# Patient Record
Sex: FEMALE | Race: OTHER | HISPANIC OR LATINO
[De-identification: names, ages, dates, MRNs, and addresses within clinical notes are randomized per-mention and may not be internally consistent; named-entity substitution may affect disease eponyms.]

---

## 2017-01-13 ENCOUNTER — APPOINTMENT (OUTPATIENT)
Dept: PULMONOLOGY | Facility: CLINIC | Age: 32
End: 2017-01-13

## 2017-01-13 VITALS
TEMPERATURE: 97.9 F | HEART RATE: 73 BPM | SYSTOLIC BLOOD PRESSURE: 111 MMHG | OXYGEN SATURATION: 98 % | BODY MASS INDEX: 20.62 KG/M2 | RESPIRATION RATE: 16 BRPM | HEIGHT: 60 IN | WEIGHT: 105 LBS | DIASTOLIC BLOOD PRESSURE: 72 MMHG

## 2017-01-13 RX ORDER — AZITHROMYCIN 500 MG/1
500 TABLET, FILM COATED ORAL
Qty: 6 | Refills: 1 | Status: ACTIVE | COMMUNITY
Start: 2017-01-13 | End: 1900-01-01

## 2017-01-15 ENCOUNTER — TRANSCRIPTION ENCOUNTER (OUTPATIENT)
Age: 32
End: 2017-01-15

## 2017-01-18 ENCOUNTER — APPOINTMENT (OUTPATIENT)
Dept: PULMONOLOGY | Facility: CLINIC | Age: 32
End: 2017-01-18

## 2017-01-18 VITALS
RESPIRATION RATE: 16 BRPM | SYSTOLIC BLOOD PRESSURE: 113 MMHG | TEMPERATURE: 98.2 F | OXYGEN SATURATION: 98 % | BODY MASS INDEX: 20.62 KG/M2 | DIASTOLIC BLOOD PRESSURE: 71 MMHG | HEIGHT: 60 IN | WEIGHT: 105 LBS | HEART RATE: 66 BPM

## 2017-01-18 DIAGNOSIS — J45.909 UNSPECIFIED ASTHMA, UNCOMPLICATED: ICD-10-CM

## 2017-01-18 DIAGNOSIS — J06.9 ACUTE UPPER RESPIRATORY INFECTION, UNSPECIFIED: ICD-10-CM

## 2017-01-18 DIAGNOSIS — R06.09 OTHER FORMS OF DYSPNEA: ICD-10-CM

## 2017-01-18 DIAGNOSIS — R05 COUGH: ICD-10-CM

## 2017-01-18 RX ORDER — METHYLPREDNISOLONE 4 MG/1
4 TABLET ORAL
Qty: 28 | Refills: 1 | Status: ACTIVE | COMMUNITY
Start: 2017-01-18 | End: 1900-01-01

## 2017-01-18 RX ORDER — OSELTAMIVIR PHOSPHATE 75 MG/1
75 CAPSULE ORAL TWICE DAILY
Qty: 10 | Refills: 0 | Status: ACTIVE | COMMUNITY
Start: 2017-01-18 | End: 1900-01-01

## 2017-03-30 ENCOUNTER — EMERGENCY (EMERGENCY)
Facility: HOSPITAL | Age: 32
LOS: 1 days | Discharge: HOME | End: 2017-03-30
Attending: EMERGENCY MEDICINE
Payer: COMMERCIAL

## 2017-03-30 VITALS
TEMPERATURE: 98 F | HEART RATE: 84 BPM | DIASTOLIC BLOOD PRESSURE: 91 MMHG | SYSTOLIC BLOOD PRESSURE: 141 MMHG | WEIGHT: 102.96 LBS | HEIGHT: 60 IN | OXYGEN SATURATION: 100 % | RESPIRATION RATE: 18 BRPM

## 2017-03-30 DIAGNOSIS — N30.01 ACUTE CYSTITIS WITH HEMATURIA: ICD-10-CM

## 2017-03-30 LAB
APPEARANCE UR: ABNORMAL
BILIRUB UR-MCNC: NEGATIVE — SIGNIFICANT CHANGE UP
COLOR SPEC: ABNORMAL
DIFF PNL FLD: ABNORMAL
GLUCOSE UR QL: NEGATIVE — SIGNIFICANT CHANGE UP
HCG UR QL: NEGATIVE — SIGNIFICANT CHANGE UP
KETONES UR-MCNC: NEGATIVE — SIGNIFICANT CHANGE UP
LEUKOCYTE ESTERASE UR-ACNC: ABNORMAL
NITRITE UR-MCNC: NEGATIVE — SIGNIFICANT CHANGE UP
PH UR: 6 — SIGNIFICANT CHANGE UP (ref 4.8–8)
PROT UR-MCNC: 100
SP GR SPEC: 1.02 — SIGNIFICANT CHANGE UP (ref 1.01–1.02)
UROBILINOGEN FLD QL: 1

## 2017-03-30 PROCEDURE — 99283 EMERGENCY DEPT VISIT LOW MDM: CPT | Mod: 25

## 2017-03-30 PROCEDURE — 81001 URINALYSIS AUTO W/SCOPE: CPT

## 2017-03-30 PROCEDURE — 99283 EMERGENCY DEPT VISIT LOW MDM: CPT

## 2017-03-30 PROCEDURE — 81025 URINE PREGNANCY TEST: CPT

## 2017-03-30 RX ORDER — CEPHALEXIN 500 MG
500 CAPSULE ORAL ONCE
Qty: 0 | Refills: 0 | Status: COMPLETED | OUTPATIENT
Start: 2017-03-30 | End: 2017-03-30

## 2017-03-30 RX ORDER — CEPHALEXIN 500 MG
1 CAPSULE ORAL
Qty: 13 | Refills: 0 | OUTPATIENT
Start: 2017-03-30 | End: 2017-04-06

## 2017-03-30 RX ADMIN — Medication 500 MILLIGRAM(S): at 02:28

## 2017-03-30 NOTE — ED PROVIDER NOTE - MEDICAL DECISION MAKING DETAILS
33yo female with dysuria and hematuria, will check for pregnancy, will check for UTI and tx if positive, no signs of ureteral stone, or other concerning symptoms. instructed to f/u with her pmd in 1 week for persistent hematuria.

## 2017-03-30 NOTE — ED ADULT NURSE NOTE - OBJECTIVE STATEMENT
Pt states that today she started having bloody urination. feels like urinating but not able to do so. Denies fever and chills, back pain, nausea and chills

## 2017-03-30 NOTE — ED PROVIDER NOTE - PHYSICAL EXAMINATION
Gen: Nad, resting comfortably  heart: rrr, ns1s2, no murmur  lungs: ctab  Abd: soft, nt, nd  Neuro: aox3, CN II-XII grossly intake  Pt ambulating without difficulty, normal gait   no cva tenderness

## 2017-03-30 NOTE — ED PROVIDER NOTE - OBJECTIVE STATEMENT
33yo female no pmh, p/w burning with urination and hematuria starting today. no back pain, no fevers chills, no vaginal bleeding, she notes a uti before, but not as bad, takes an OCP. Nothing else is bother her, she didn't take anything for the pain.

## 2017-06-17 ENCOUNTER — TRANSCRIPTION ENCOUNTER (OUTPATIENT)
Age: 32
End: 2017-06-17

## 2018-11-21 ENCOUNTER — EMERGENCY (EMERGENCY)
Facility: HOSPITAL | Age: 33
LOS: 1 days | Discharge: HOME | End: 2018-11-21
Attending: EMERGENCY MEDICINE
Payer: COMMERCIAL

## 2018-11-21 VITALS
OXYGEN SATURATION: 98 % | SYSTOLIC BLOOD PRESSURE: 140 MMHG | RESPIRATION RATE: 16 BRPM | TEMPERATURE: 98 F | DIASTOLIC BLOOD PRESSURE: 88 MMHG | HEART RATE: 84 BPM

## 2018-11-21 VITALS — WEIGHT: 119.93 LBS

## 2018-11-21 DIAGNOSIS — N75.0 CYST OF BARTHOLIN'S GLAND: ICD-10-CM

## 2018-11-21 LAB
APPEARANCE UR: CLEAR — SIGNIFICANT CHANGE UP
BILIRUB UR-MCNC: NEGATIVE — SIGNIFICANT CHANGE UP
COLOR SPEC: YELLOW — SIGNIFICANT CHANGE UP
DIFF PNL FLD: ABNORMAL
GLUCOSE UR QL: NEGATIVE — SIGNIFICANT CHANGE UP
HCG UR QL: NEGATIVE — SIGNIFICANT CHANGE UP
KETONES UR-MCNC: NEGATIVE — SIGNIFICANT CHANGE UP
LEUKOCYTE ESTERASE UR-ACNC: ABNORMAL
NITRITE UR-MCNC: NEGATIVE — SIGNIFICANT CHANGE UP
PH UR: 6.5 — SIGNIFICANT CHANGE UP (ref 5–8)
PROT UR-MCNC: NEGATIVE — SIGNIFICANT CHANGE UP
SP GR SPEC: 1 — LOW (ref 1.01–1.02)
UROBILINOGEN FLD QL: NEGATIVE — SIGNIFICANT CHANGE UP

## 2018-11-21 PROCEDURE — 81025 URINE PREGNANCY TEST: CPT

## 2018-11-21 PROCEDURE — 99284 EMERGENCY DEPT VISIT MOD MDM: CPT

## 2018-11-21 PROCEDURE — 81001 URINALYSIS AUTO W/SCOPE: CPT

## 2018-11-21 RX ORDER — MELOXICAM 15 MG/1
1 TABLET ORAL
Qty: 5 | Refills: 0 | OUTPATIENT
Start: 2018-11-21 | End: 2018-11-25

## 2018-11-21 RX ORDER — OXYCODONE AND ACETAMINOPHEN 5; 325 MG/1; MG/1
1 TABLET ORAL ONCE
Qty: 0 | Refills: 0 | Status: DISCONTINUED | OUTPATIENT
Start: 2018-11-21 | End: 2018-11-21

## 2018-11-21 RX ADMIN — Medication 1 TABLET(S): at 18:10

## 2018-11-21 RX ADMIN — OXYCODONE AND ACETAMINOPHEN 1 TABLET(S): 5; 325 TABLET ORAL at 17:42

## 2018-11-21 NOTE — ED ADULT NURSE NOTE - NSIMPLEMENTINTERV_GEN_ALL_ED
Implemented All Universal Safety Interventions:  [place] to call system. Call bell, personal items and telephone within reach. Instruct patient to call for assistance. Room bathroom lighting operational. Non-slip footwear when patient is off stretcher. Physically safe environment: no spills, clutter or unnecessary equipment. Stretcher in lowest position, wheels locked, appropriate side rails in place.

## 2018-11-21 NOTE — ED STATDOCS - OBJECTIVE STATEMENT
Telemedicine assessment was conducted (using real time 2 way audio-video technology) by Dr. Gilberto Salmeron located at [address] [place] [id number]  +++++++++++++++++++++++++++++++++++++++++++++++++++  History and Plan:   32 y/o F pt w/ PMHx of presents to ED c/o worsening vaginal swelling and pain x 3 days. Pt reports pain with sitting. Pt took Advil with improvement of symptoms. Pt states she had intercourse the day prior to vaginal swelling and pain. Pt has had no similar episode in the past. Denies vaginal discharge and vaginal bleeding. Pt has no history of STDs or recent history of unprotected intercourse. Pt took Motrin today without improvement of symptoms. NKDA. LNMP: 11/5/2018.  Plan: Will obtain UA. Provider on site to evaluate with examination. Patient seen by me in intake for initial assessment and ordering. Physician on site to follow results and further evaluate and treat patient.

## 2018-11-21 NOTE — ED PROVIDER NOTE - GENITOURINARY EXTERNAL GENERAL. FEMALE
Left labia swelling and erythema, tenderness over bartholins gland, redness of left vulva and labia majora

## 2018-11-21 NOTE — ED PROVIDER NOTE - OBJECTIVE STATEMENT
32 y/o F patient with no significant PMHx of asthma and no significant PSHx presents to the ED with worsening left vaginal pain and swelling for x2 days. Patient denies a history of presenting Sx. Patient states she took Advil today with no relief. Patient denies fever and any other complaints. Patient denies new sexual partners. NKDA.

## 2018-11-21 NOTE — CONSULT NOTE ADULT - SUBJECTIVE AND OBJECTIVE BOX
32 y/o F patient with no significant PMHx of asthma and no significant PSHx presents to the ED with worsening left vaginal pain and swelling for x2 days. Patient denies a history of presenting Sx. Patient states she took Advil today with no relief. Patient denies fever and any other complaints. Patient denies new sexual partners. NKDA.	  · Presenting Symptoms: vaginal pain, vaginal swelling	  gyn   pob/gynhx: ; std's; none no abn pap smear; no fibroids;   pmhx: none  pshx: none  all: nka  meds: cephalexin 500 mg oral capsule: 1 cap(s) orally 2 times a day  · 	Z-Yanick: 1 unit(s)  once a day  · 	predniSONE 20 mg oral tablet: 2 tab(s) orally once a day  · 	Acidophilus: 2 tab(s) orally 3 times a day    sochx: no etoh/drug/tobacco use    REVIEW OF SYSTEMS: see HPI	    PE:  Vital Signs Last 24 Hrs  T(C): 36.8 (2018 15:27), Max: 36.8 (2018 15:27)  T(F): 98.2 (2018 15:27), Max: 98.2 (2018 15:27)  HR: 84 (2018 15:27) (84 - 84)  BP: 140/88 (2018 15:27) (140/88 - 140/88)  BP(mean): --  RR: 16 (2018 15:27) (16 - 16)  SpO2: 98% (2018 15:27) (98% - 98%)  abd: +bs; soft, nt, nd, no rebound or guarding; no cvat b/l  pelvis:   External Genitalia, Female: Left labia swelling and erythema, tenderness over bartholins gland, redness of left vulva and labia majora	              Urinalysis Basic - ( 2018 16:13 )    Color: Yellow / Appearance: Clear / S.005 / pH: x  Gluc: x / Ketone: Negative  / Bili: Negative / Urobili: Negative   Blood: x / Protein: Negative / Nitrite: Negative   Leuk Esterase: Moderate / RBC: 5-10 /HPF / WBC 11-25 /HPF   Sq Epi: x / Non Sq Epi: Many /HPF / Bacteria: Few /HPF

## 2018-11-21 NOTE — ED ADULT NURSE NOTE - OBJECTIVE STATEMENT
pt from home c/o of vaginal swelling with pain x3 days pt is alert awake oriented x3 denies any trauma or injury denies any fever at home denies any vaginal bleeding LMP 11/5

## 2018-11-21 NOTE — ED PROVIDER NOTE - PROGRESS NOTE DETAILS
Patient seen by OB/Gyn, no ID at this time, will give ABX, and sitz baths. Follow up with private GYN. Pt is well appearing walking with steady gait, stable for discharge and follow up without fail with medical doctor. I had a detailed discussion with the patient and/or guardian regarding the historical points, exam findings, and any diagnostic results supporting the discharge diagnosis. Pt educated on care and need for follow up. Strict return instructions and red flag signs and symptoms discussed with patient. Questions answered. Pt shows understanding of discharge information and agrees to follow.

## 2018-11-21 NOTE — CONSULT NOTE ADULT - PROBLEM SELECTOR RECOMMENDATION 9
Left Pratima gill  pt seen with   will start augmentin 875bid x 10 days  naprosyn  sitz baths  follow up with own gyn  d/w dr. gene lance

## 2019-03-13 ENCOUNTER — TRANSCRIPTION ENCOUNTER (OUTPATIENT)
Age: 34
End: 2019-03-13

## 2019-05-05 ENCOUNTER — TRANSCRIPTION ENCOUNTER (OUTPATIENT)
Age: 34
End: 2019-05-05

## 2020-06-12 ENCOUNTER — OUTPATIENT (OUTPATIENT)
Dept: OUTPATIENT SERVICES | Facility: HOSPITAL | Age: 35
LOS: 1 days | End: 2020-06-12
Payer: COMMERCIAL

## 2020-06-12 ENCOUNTER — RESULT REVIEW (OUTPATIENT)
Age: 35
End: 2020-06-12

## 2020-06-12 ENCOUNTER — APPOINTMENT (OUTPATIENT)
Dept: RADIOLOGY | Facility: IMAGING CENTER | Age: 35
End: 2020-06-12
Payer: COMMERCIAL

## 2020-06-12 DIAGNOSIS — Z00.8 ENCOUNTER FOR OTHER GENERAL EXAMINATION: ICD-10-CM

## 2020-06-12 PROCEDURE — 71046 X-RAY EXAM CHEST 2 VIEWS: CPT

## 2020-06-12 PROCEDURE — 71046 X-RAY EXAM CHEST 2 VIEWS: CPT | Mod: 26

## 2020-12-15 PROBLEM — J06.9 ACUTE URI: Status: RESOLVED | Noted: 2017-01-13 | Resolved: 2020-12-15

## 2021-08-25 ENCOUNTER — TRANSCRIPTION ENCOUNTER (OUTPATIENT)
Age: 36
End: 2021-08-25

## 2022-05-25 ENCOUNTER — NON-APPOINTMENT (OUTPATIENT)
Age: 37
End: 2022-05-25

## 2022-06-08 ENCOUNTER — EMERGENCY (EMERGENCY)
Facility: HOSPITAL | Age: 37
LOS: 0 days | Discharge: HOME | End: 2022-06-08
Attending: EMERGENCY MEDICINE
Payer: COMMERCIAL

## 2022-06-08 VITALS
SYSTOLIC BLOOD PRESSURE: 133 MMHG | TEMPERATURE: 99 F | WEIGHT: 125 LBS | HEIGHT: 60 IN | OXYGEN SATURATION: 98 % | HEART RATE: 88 BPM | RESPIRATION RATE: 18 BRPM | DIASTOLIC BLOOD PRESSURE: 81 MMHG
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
## 2022-06-08 DIAGNOSIS — J45.909 UNSPECIFIED ASTHMA, UNCOMPLICATED: ICD-10-CM
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
## 2022-06-08 DIAGNOSIS — M25.561 PAIN IN RIGHT KNEE: ICD-10-CM
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
## 2022-06-08 DIAGNOSIS — S83.91XA SPRAIN OF UNSPECIFIED SITE OF RIGHT KNEE, INITIAL ENCOUNTER: ICD-10-CM

## 2022-06-08 PROCEDURE — 73562 X-RAY EXAM OF KNEE 3: CPT | Mod: 26,RT

## 2022-06-08 PROCEDURE — 99283 EMERGENCY DEPT VISIT LOW MDM: CPT | Mod: 25

## 2022-06-08 PROCEDURE — 73562 X-RAY EXAM OF KNEE 3: CPT | Mod: RT

## 2022-06-08 PROCEDURE — 99283 EMERGENCY DEPT VISIT LOW MDM: CPT

## 2022-06-08 RX ORDER — IBUPROFEN 200 MG
600 TABLET ORAL ONCE
Refills: 0 | Status: COMPLETED | OUTPATIENT
Start: 2022-06-08 | End: 2022-06-08

## 2022-06-08 RX ADMIN — Medication 600 MILLIGRAM(S): at 22:01

## 2022-06-08 NOTE — ED PROVIDER NOTE - NS ED ROS FT
Constitutional: nad, well appearing  HEENT:  no nasal congestion, eye drainage or ear pain.    CVS:  no cp  Resp:  No sob, no cough  GI:  no abdominal pain, no nausea or vomiting  :  no dysuria  MSK: +right knee pain  Skin: no rash  Neuro: no change in mental status or level of consciousness  Heme/lymph: no bleeding

## 2022-06-08 NOTE — ED PROVIDER NOTE - CLINICAL SUMMARY MEDICAL DECISION MAKING FREE TEXT BOX
Likely internal derangement of knee. Do not suspect dislocation. Neurovascularly intact distally. Will x-ray, likely place in knee mobilizer, follow-up with ortho.

## 2022-06-08 NOTE — ED PROVIDER NOTE - PHYSICAL EXAMINATION
Constitutional: NAD, well appearing  HEENT: no rhinorrhea, PERRL, no oropharyngeal erythema or exudates, midline uvula.  TMs clear.  CVS:  RRR, no m/r/g  Resp:  CTAB  GI: soft, ntnd  MSK: TTP medial aspect of right knee. negative anterior posterior drawers.   Neuro:  A&Ox3, 5/5 strength to all extremities,  SILT to all extremities  Skin: no rash  psych: clear thought content  Heme/lymph:  No LAD Constitutional: NAD, well appearing  HEENT: no rhinorrhea, PERRL, no oropharyngeal erythema or exudates, midline uvula.  TMs clear.  CVS:  RRR, no m/r/g  Resp:  CTAB  GI: soft, ntnd  MSK: +TTP medial aspect of right knee. negative anterior posterior drawers.   Neuro:  A&Ox3, 5/5 strength to all extremities,  SILT to all extremities  Skin: no rash  psych: clear thought content  Heme/lymph:  No LAD

## 2022-06-08 NOTE — ED PROVIDER NOTE - CARE PROVIDER_API CALL
Ian Valencia (DO)  Orthopaedic Surgery  [address] [place] [address]  Phone: [phone]  Fax: [phone]  Follow Up Time: 1-3 Days

## 2022-06-08 NOTE — ED PROVIDER NOTE - OBJECTIVE STATEMENT
38 y/o female with PMHx of asthma presents to the ED c/o right knee pain after pivoting while playing with her dog 2 hours PTA. Pt has hx of acl tear to right knee. Pt took 500mg tylenol eipta with no relief.    liekly internal derrangtemen t of knee do not suspect edilocaton, neurovascularly intact distally  xray liekyl place in knee mobilizer follow-up with ortho. 38 y/o female with PMHx of asthma presents to the ED c/o right knee pain after pivoting while playing with her dog 2 hours PTA. Pt has hx of ACL tear to right knee. Pt took 500mg Tylenol PTA with no relief. Pt unable to move leg 2/2 pain.

## 2022-06-08 NOTE — ED PROVIDER NOTE - NSICDXFAMILYHX_GEN_ALL_CORE_FT
ED Medical Screen (RME)





- General


Chief Complaint: Cough


Stated Complaint: DIZZY


Time Seen by Provider: 08/10/18 17:09


Mode of Arrival: Ambulatory


Information source: Patient


TRAVEL OUTSIDE OF THE [place] IN LAST 30 DAYS: No





- HPI


Patient complains to provider of: cough


Onset: Other - pt. with c/o productive cough and occ. dizziness for the past 

several days.





- Related Data


Allergies/Adverse Reactions: 


 





No Known Allergies Allergy (Verified 08/10/18 16:08)


 











Past Medical History





- Social History


Chew tobacco use (# tins/day): No


Frequency of alcohol use: Occasional


Drug Abuse: None





- Past Medical History


Cardiac Medical History: Reports: Hx Hypercholesterolemia, Hx Hypertension


Pulmonary Medical History: Reports: Hx COPD, Hx Pneumonia


Renal/ Medical History: Denies: Hx Peritoneal Dialysis


Psychiatric Medical History: Reports: Hx Depression, Hx Schizophrenia


Past Surgical History: Reports: Hx Cholecystectomy, Hx Herniorrhaphy





Physical Exam





- Vital signs


Vitals: 





 











Temp Pulse Resp BP Pulse Ox


 


 99.2 F   78   20   140/87 H  96 


 


 08/10/18 16:20  08/10/18 16:20  08/10/18 16:20  08/10/18 16:20  08/10/18 16:20














Course





- Vital Signs


Vital signs: 





 











Temp Pulse Resp BP Pulse Ox


 


 99.2 F   78   20   140/87 H  96 


 


 08/10/18 16:20  08/10/18 16:20  08/10/18 16:20  08/10/18 16:20  08/10/18 16:20 FAMILY HISTORY:  No pertinent family history in first degree relatives

## 2022-06-08 NOTE — ED PROVIDER NOTE - SPECIALTY CARE
The Mammogram Department at [facility] called about the order for this patients upcoming Mammo. They said they are trying to figure out when her last Mammogram was and they have tried to call the patient to figure that out. She has a feeling that the patient might need a Mammo Bilateral and she was wondering if Edgardo could go ahead and put that order in and also keep the original in there. That way she can choose the correct order when the patient comes in.   Orthopedic Surgery

## 2022-06-08 NOTE — ED PROVIDER NOTE - PROGRESS NOTE DETAILS
Xray unremarkable.  NVI.  Well appearing.  WIll place in knee immobilizer.  To f/u ortho.  D/c home with strict return precautions and prompt outpatient f/u.

## 2022-06-08 NOTE — ED ADULT TRIAGE NOTE - CHIEF COMPLAINT QUOTE
c/o severe right knee pain after pivoting while playing with dog in backyard. pt states she didn't hear a popping sensation. hx acl tear in the past. right leg splinted and braced PTA by EMS

## 2022-06-08 NOTE — ED PROVIDER NOTE - PATIENT PORTAL LINK FT
You can access the FollowMyHealth Patient Portal offered by [facility] by registering at the following website: [email or web address]. By joining depict’s FollowMyHealth portal, you will also be able to view your health information using other applications (apps) compatible with our system.

## 2022-06-08 NOTE — ED ADULT NURSE NOTE - NSIMPLEMENTINTERV_GEN_ALL_ED
Implemented All Fall Risk Interventions:  [place] to call system. Call bell, personal items and telephone within reach. Instruct patient to call for assistance. Room bathroom lighting operational. Non-slip footwear when patient is off stretcher. Physically safe environment: no spills, clutter or unnecessary equipment. Stretcher in lowest position, wheels locked, appropriate side rails in place. Provide visual cue, wrist band, yellow gown, etc. Monitor gait and stability. Monitor for mental status changes and reorient to person, place, and time. Review medications for side effects contributing to fall risk. Reinforce activity limits and safety measures with patient and family.

## 2022-06-09 ENCOUNTER — TRANSCRIPTION ENCOUNTER (OUTPATIENT)
Age: 37
End: 2022-06-09

## 2022-06-09 ENCOUNTER — NON-APPOINTMENT (OUTPATIENT)
Age: 37
End: 2022-06-09

## 2022-06-13 ENCOUNTER — APPOINTMENT (OUTPATIENT)
Dept: ORTHOPEDIC SURGERY | Facility: CLINIC | Age: 37
End: 2022-06-13

## 2022-06-13 ENCOUNTER — NON-APPOINTMENT (OUTPATIENT)
Age: 37
End: 2022-06-13

## 2022-06-13 DIAGNOSIS — M25.561 PAIN IN RIGHT KNEE: ICD-10-CM

## 2022-06-13 PROCEDURE — 99203 OFFICE O/P NEW LOW 30 MIN: CPT

## 2022-06-14 NOTE — PHYSICAL EXAM
[de-identified] : Right Knee:\par Knee: Range of Motion in Degrees	\par 	                  Claimant:	Normal:	\par Flexion Active	  135 	                135-degrees	\par Flexion Passive	  135	                135-degrees	\par Extension Active	  0-5	                0-5-degrees	\par Extension Passive	  0-5	                0-5-degrees	\par \par No weakness to flexion/extension.  Positive instability in the AP plane.  No evidence of instability in varus or valgus stress.  Mild tenderness and a 1+ gap to valgus stress.  Positive Lachman.  Positive pivot shift.  Negative anterior drawer test.  Negative posterior drawer test.  Positive medial joint line tenderness.  Negative lateral joint line tenderness.  Positive Anny.  Positive Apley grind.  No tenderness over the medial and lateral facet of the patella.  No patellofemoral crepitations.  No lateral tilting patella.  No patella apprehension.  No crepitation in the medial and lateral femoral condyle.  No proximal or distal swelling, edema or tenderness.  No gross motor or sensory deficits.  Mild intra-articular swelling.  2+ DP and PT pulses.  No varus or valgus malalignment.  Skin is intact.  No rashes, scars or lesions.   \par \par Left Knee:\par Knee: Range of Motion in Degrees	\par 	                  Claimant:	Normal:	\par Flexion Active	  135 	                135-degrees	\par Flexion Passive	  135	                135-degrees	\par Extension Active	  0-5	                0-5-degrees	\par Extension Passive	  0-5	                0-5-degrees	\par \par No weakness to flexion/extension.  No evidence of instability in the AP plane or varus or valgus stress.  Negative  Lachman.  Negative pivot shift.  Negative anterior drawer test.  Negative posterior drawer test.  Negative Anny.  Negative Apley grind.  No medial or lateral joint line tenderness.  No tenderness over the medial and lateral facet of the patella.  No patellofemoral crepitations.  No lateral tilting patella.  No patellar apprehension.  No crepitation in the medial and lateral femoral condyle.  No proximal tenderness.  No gross motor or sensory deficits.  Mild effusion.  2+ DP and PT pulses. No varus or valgus malalignment.  Skin is intact.  No rashes, scars or lesions.  \par   [de-identified] : Gait and Station:  Ambulating with a slightly antalgic to antalgic gait.  Station:  Normal.  [de-identified] : Appearance:  Well-developed, well-nourished female in no acute distress.\par   [de-identified] : Review of outside radiographs show no obvious osseous abnormalities.

## 2022-06-14 NOTE — DISCUSSION/SUMMARY
[de-identified] : At this time, due to ACL, MCL and medial meniscal tear of the right knee, I recommend collateral hinged bracing, ice and elevation.  I recommend an MRI.  She will be reassessed after the MRI.\par \par The patient was prescribed a rigid support Playmaker knee brace with range of motion joints.  The brace will safely protect the patient and help to facilitate healing by stabilizing and controlling the knee.  In order to prevent skin breakdown along bony prominences and to avoid compression of the peroneal nerve, a custom fit is necessary.\par

## 2022-06-14 NOTE — CONSULT LETTER
[Dear  ___] : Dear  [unfilled], [Consult Letter:] : I had the pleasure of evaluating your patient, [unfilled]. [Please see my note below.] : Please see my note below. [Consult Closing:] : Thank you very much for allowing me to participate in the care of this patient.  If you have any questions, please do not hesitate to contact me. [Sincerely,] : Sincerely, [FreeTextEntry3] : Fam Cason, III, MD\par

## 2022-06-14 NOTE — ADDENDUM
[FreeTextEntry1] : This note was written by Latrice Vick on 06/14/2022 acting as scribe for Fam Cason III, MD

## 2022-06-14 NOTE — HISTORY OF PRESENT ILLNESS
[de-identified] : The patient comes in today with a long history of partial and/or complete ACL tear.  She was running in the backyard, slipped and twisted her knee and had sudden onset of pain and swelling.  She had x-rays at [facility] and comes here for evaluation.  The patient states the onset/injury occurred 06/08/2022.  This injury is not work related or due to an automobile accident.  The patient states the pain is localized.  The patient describes the pain as dull. [3] : a current pain level of 3/10 [de-identified] : walking, bending [de-identified] : rest, ice

## 2022-06-15 DIAGNOSIS — Z87.09 PERSONAL HISTORY OF OTHER DISEASES OF THE RESPIRATORY SYSTEM: ICD-10-CM

## 2022-06-15 DIAGNOSIS — Z82.49 FAMILY HISTORY OF ISCHEMIC HEART DISEASE AND OTHER DISEASES OF THE CIRCULATORY SYSTEM: ICD-10-CM

## 2022-06-15 DIAGNOSIS — Z72.89 OTHER PROBLEMS RELATED TO LIFESTYLE: ICD-10-CM

## 2022-06-15 DIAGNOSIS — Z83.3 FAMILY HISTORY OF DIABETES MELLITUS: ICD-10-CM

## 2022-06-15 RX ORDER — CLARITHROMYCIN 250 MG/5ML
FOR SUSPENSION ORAL
Refills: 0 | Status: ACTIVE | COMMUNITY

## 2022-06-29 ENCOUNTER — APPOINTMENT (OUTPATIENT)
Dept: ORTHOPEDIC SURGERY | Facility: CLINIC | Age: 37
End: 2022-06-29

## 2022-06-29 PROCEDURE — 99213 OFFICE O/P EST LOW 20 MIN: CPT

## 2022-06-30 NOTE — ADDENDUM
[FreeTextEntry1] : This note was written by Yvette Figueroa on 06/30/2022 acting as scribe for Fam Cason III, MD

## 2022-06-30 NOTE — DISCUSSION/SUMMARY
[de-identified] : The patient presents with ACL, MCL and medial meniscal tear, right knee.  At this time I had a long discussion with her.  At this point she is feeling much better with her brace on.  I am going to recommend to continue her brace management to heal her MCL.  She will be reassessed in four weeks.  We will discuss the potential for further intervention depending on how she responds.

## 2022-06-30 NOTE — PHYSICAL EXAM
[de-identified] : Right Knee:\par Knee: Range of Motion in Degrees	\par 	  Claimant:	Normal:	\par Flexion Active	 135 	 135-degrees	\par Flexion Passive	 135	 135-degrees	\par Extension Active	 0-5	 0-5-degrees	\par Extension Passive	 0-5	 0-5-degrees	\par \par No weakness to flexion/extension. Positive instability in the AP plane. No evidence of instability in varus or valgus stress. Mild tenderness and a 1+ gap to valgus stress. Positive Lachman. Positive pivot shift. Negative anterior drawer test. Negative posterior drawer test. Positive medial joint line tenderness. Negative lateral joint line tenderness. Positive Anny. Positive Apley grind. No tenderness over the medial and lateral facet of the patella. No patellofemoral crepitations. No lateral tilting patella. No patella apprehension. No crepitation in the medial and lateral femoral condyle. No proximal or distal swelling, edema or tenderness. No gross motor or sensory deficits. Mild intra-articular swelling. 2+ DP and PT pulses. No varus or valgus malalignment. Skin is intact. No rashes, scars or lesions. \par  [de-identified] : She is ambulating in a brace.  Station: Normal [de-identified] : Appearance: Well-developed, well-nourished female  in no acute distress.  [de-identified] : We reviewed the MRI of the right knee.  It shows a chronic ACL.  It shows a medial meniscal tears and bone contusions.

## 2022-07-25 ENCOUNTER — APPOINTMENT (OUTPATIENT)
Dept: ORTHOPEDIC SURGERY | Facility: CLINIC | Age: 37
End: 2022-07-25

## 2022-07-25 DIAGNOSIS — S83.511A SPRAIN OF ANTERIOR CRUCIATE LIGAMENT OF RIGHT KNEE, INITIAL ENCOUNTER: ICD-10-CM

## 2022-07-25 PROCEDURE — 99213 OFFICE O/P EST LOW 20 MIN: CPT

## 2022-07-27 PROBLEM — S83.511A SPRAIN OF ANTERIOR CRUCIATE LIGAMENT OF RIGHT KNEE, INITIAL ENCOUNTER: Status: ACTIVE | Noted: 2022-07-25

## 2022-07-27 NOTE — DISCUSSION/SUMMARY
[de-identified] : At this time, due to ACL, meniscal tear and healed MCL of the right knee, she is started on therapy.  She will wean off of her brace.  She will be reassessed in one month.

## 2022-07-27 NOTE — PHYSICAL EXAM
[de-identified] : Right Knee: \par Range of Motion in Degrees	\par 	                  Claimant:	Normal:	\par Flexion Active	  135 	                135-degrees	\par Flexion Passive	  135	                135-degrees	\par Extension Active	  0-5	                0-5-degrees	\par Extension Passive	  0-5	                0-5-degrees\par \par No gapping to valgus stress.  Medial joint line tenderness. 1+ Lachman without a solid endpoint, although she is guarding.	  \par   [de-identified] : Ambulating with a slightly antalgic to antalgic gait.  Station:  Normal.  [de-identified] : Appearance:  Well-developed, well-nourished female in no acute distress.\par

## 2022-07-27 NOTE — ADDENDUM
[FreeTextEntry1] : This note was written by Jaz Curtis on 07/27/2022 acting as a scribe for MIRLANDE MILLER III, MD

## 2022-07-27 NOTE — HISTORY OF PRESENT ILLNESS
[de-identified] : The patient comes in today for her right knee.  She states she is feeling a little bit better.

## 2022-08-22 ENCOUNTER — APPOINTMENT (OUTPATIENT)
Dept: ORTHOPEDIC SURGERY | Facility: CLINIC | Age: 37
End: 2022-08-22

## 2022-08-22 PROCEDURE — 99213 OFFICE O/P EST LOW 20 MIN: CPT

## 2022-08-23 NOTE — ADDENDUM
[FreeTextEntry1] : This note was written by Jaz Curtis on 08/23/2022 acting as a scribe for MIRLANDE MILLER III, MD

## 2022-08-23 NOTE — HISTORY OF PRESENT ILLNESS
[de-identified] : The patient comes in today for the right knee.  Overall, she has no pain.  She is still having instability.

## 2022-08-23 NOTE — DISCUSSION/SUMMARY
[de-identified] : At this time, due to ACL of the right knee, I recommend she be referred to Dr. Mcfarland for definitive surgical management.

## 2022-08-23 NOTE — PHYSICAL EXAM
[de-identified] : Right Knee: \par Range of Motion in Degrees	\par 	                  Claimant:	Normal:	\par Flexion Active	  135 	                135-degrees	\par Flexion Passive	  135	                135-degrees	\par Extension Active	  0-5	                0-5-degrees	\par Extension Passive	  0-5	                0-5-degrees	\par \par No weakness to flexion/extension.  Positive instability in the AP plane.  No evidence of instability in varus or valgus stress.  1-2+ Lachman with a solid endpoint. Moderate positive pivot shift.  Negative anterior drawer test.  Negative posterior drawer test.  Negative Anny.  Negative Apley grind.  No medial or lateral joint line tenderness.  No tenderness over the medial and lateral facet of the patella.  No patellofemoral crepitations.  No lateral tilting patella.  No patella apprehension.  No crepitation in the medial and lateral femoral condyle.  No proximal or distal swelling, edema or tenderness.  No gross motor or sensory deficits.  Mild intra-articular swelling.  2+ DP and PT pulses.  No varus or valgus malalignment.  Skin is intact.  No rashes, scars or lesions.  \par   [de-identified] : Ambulating with a slightly antalgic to antalgic gait.  Station:  Normal.  [de-identified] : Appearance:  Well-developed, well-nourished female in no acute distress.\par

## 2022-09-13 ENCOUNTER — APPOINTMENT (OUTPATIENT)
Dept: ORTHOPEDIC SURGERY | Facility: CLINIC | Age: 37
End: 2022-09-13

## 2022-09-13 ENCOUNTER — NON-APPOINTMENT (OUTPATIENT)
Age: 37
End: 2022-09-13

## 2022-09-13 PROCEDURE — 99214 OFFICE O/P EST MOD 30 MIN: CPT

## 2022-09-16 NOTE — PHYSICAL EXAM
[de-identified] : Physical Exam:\par General: Well appearing, no acute distress\par Neurologic: A&Ox3, No focal deficits\par Head: NCAT without abrasions, lacerations, or ecchymosis to head, face, or scalp\par Eyes: No scleral icterus, no gross abnormalities\par Respiratory: Equal chest wall expansion bilaterally, no accessory muscle use\par Lymphatic: No lymphadenopathy palpated\par Skin: Warm and dry\par Psychiatric: Normal mood and affect\par \par Right knee:\par Inspection/Palpation: Gait evaluation does reveal a limp. There is no inguinal adenopathy. Limb is well-perfused, without skin lesions, shows a grossly normal motor and sensory examination. \par ROM: The Right knee motion is significantly reduced and does cause significant pain. The knee exhibits a moderate effusion. \par Muscle/Nerves: Quad strength decreased secondary to injury. Motor exam 5/5 distally, EHL/FHL/GSC/TA\par SILT L4-S1\par \par Special Tests: \par Joint line tenderness noted lateral joint line at 0 and 90 degrees. \par Grade I valgus stress. Normal varus stress.\par Negative posterior drawer. \par The knee has a 2A Lachman and positive anterior drawer.\par Unable to ellicit a pivot shift secondary to pain.  \par Normal hip and ankle exam.  [de-identified] : Procedure: MRI of Right Knee \par Dated: 6/23/2022\par \par Impression:\par 1. Longitudinal oblique tear of the medial meniscus posterior horn. Intact lateral meniscus. \par 2. 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 \par 3. Kissing bony contusions along the lateral femoral condyle and lateral tibial plateau. Additional bony contusions along the medial tibial plateau and medial femoral condyle. No fracture. \par 4. Preserved tricompartmental articular cartilage.
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
## 2022-09-16 NOTE — ADDENDUM
[FreeTextEntry1] : Documented by Tameka Gauthier acting as a scribe for Dr. Mcfarland and Kulwant Long PA-C on 09/13/2022. \par \par All medical record entries made by the Scribe were at my, Dr. Mcfarland, and Kulwant Long's, direction and\par personally dictated by me on 09/13/2022. I have reviewed the chart and agree that the record\par accurately reflects my personal performance of the history, physical exam, procedure and imaging.

## 2022-09-30 ENCOUNTER — NON-APPOINTMENT (OUTPATIENT)
Age: 37
End: 2022-09-30

## 2022-11-04 ENCOUNTER — APPOINTMENT (OUTPATIENT)
Dept: ORTHOPEDIC SURGERY | Facility: AMBULATORY SURGERY CENTER | Age: 37
End: 2022-11-04

## 2022-11-04 PROCEDURE — 29882 ARTHRS KNE SRG MNISC RPR M/L: CPT | Mod: RT

## 2022-11-04 PROCEDURE — 29888 ARTHRS AID ACL RPR/AGMNTJ: CPT | Mod: RT

## 2022-11-04 RX ORDER — OXYCODONE 5 MG/1
5 TABLET ORAL
Qty: 25 | Refills: 0 | Status: ACTIVE | COMMUNITY
Start: 2022-11-04 | End: 1900-01-01

## 2022-11-04 RX ORDER — ASPIRIN ENTERIC COATED TABLETS 81 MG 81 MG/1
81 TABLET, DELAYED RELEASE ORAL DAILY
Qty: 14 | Refills: 0 | Status: ACTIVE | COMMUNITY
Start: 2022-11-04 | End: 1900-01-01

## 2022-11-04 RX ORDER — ONDANSETRON 4 MG/1
4 TABLET ORAL
Qty: 42 | Refills: 0 | Status: COMPLETED | COMMUNITY
Start: 2022-11-04 | End: 2022-11-11

## 2022-11-13 ENCOUNTER — NON-APPOINTMENT (OUTPATIENT)
Age: 37
End: 2022-11-13

## 2022-11-15 ENCOUNTER — APPOINTMENT (OUTPATIENT)
Dept: ORTHOPEDIC SURGERY | Facility: CLINIC | Age: 37
End: 2022-11-15

## 2022-11-15 PROCEDURE — 99024 POSTOP FOLLOW-UP VISIT: CPT

## 2022-11-15 PROCEDURE — 73560 X-RAY EXAM OF KNEE 1 OR 2: CPT | Mod: RT

## 2022-11-17 NOTE — HISTORY OF PRESENT ILLNESS
[___ Days Post Op] : post op day #[unfilled] [3] : the patient reports pain that is 3/10 in severity [Clean/Dry/Intact] : clean, dry and intact [Neuro Intact] : an unremarkable neurological exam [Vascular Intact] : ~T peripheral vascular exam normal [Xray (Date:___)] : [unfilled] Xray -  [Doing Well] : is doing well [No Sign of Infection] : is showing no signs of infection [Chills] : no chills [Fever] : no fever [Nausea] : no nausea [Vomiting] : no vomiting [de-identified] : spo R knee ACL reconstruction with quad graft. DOSL 11/04/2022 [de-identified] : QUYNH DEMPSEY is a 37 year female being seen for first post op R knee ACL reconstruction with quad graft. QUYNH presents today wearing karine locked in extension and ambulating with crutches.  She denies fever chills, redness around/near incision site and numbness/tingling. She denies nausea/vomiting and admits to their appetite being back to normal since surgery. Patient has been utilizing Tylenol for pain with relief. They longer require narcotics. She has started physical therapy. \par  [de-identified] : Right Knee  there is moderate effusion without warmth and mild ecchymosis throughout the leg. Knee motion is 0 - 90 degrees of passive ROM, straight leg raise with extension lag and pain free. Weakened quad strength. Full sensation intact and dorsalis pedis pulses 2+.  Straight leg raise with lag  Special Tests: NEG Lachman, NEG anterior drawer, NEG posterior drawer with collateral testing and varus/valgus normal.  NEG Homans, no calf tenderness, soft and compressible  [de-identified] : 2 views of R knee were performed today and available for me to review. Results were discussed with the patient. They demonstrate no f/x, dislocation or other deformity.\par  [de-identified] : QUYNH DEMPSEY is a 37 year y/o female who presents today for spo R knee ACL reconstruction with quad graft. DOSL 11/04/2022 . Surgical sites are clean, dry, and intact.   Patient will follow up in 4 wks for repeat clinical assessment. She may continue to use tylenol prn for pain. She will continue wearing the bled leeroy brace. She will continue with use of the crutches until we see her again in 4 weeks.She agrees to the latter plan and does not have any further questions or concerns.

## 2022-12-08 ENCOUNTER — APPOINTMENT (OUTPATIENT)
Dept: ORTHOPEDIC SURGERY | Facility: CLINIC | Age: 37
End: 2022-12-08

## 2022-12-08 VITALS — WEIGHT: 110 LBS | HEIGHT: 60 IN | BODY MASS INDEX: 21.6 KG/M2

## 2022-12-08 DIAGNOSIS — S83.241A OTHER TEAR OF MEDIAL MENISCUS, CURRENT INJURY, RIGHT KNEE, INITIAL ENCOUNTER: ICD-10-CM

## 2022-12-08 PROCEDURE — 99024 POSTOP FOLLOW-UP VISIT: CPT

## 2022-12-08 NOTE — HISTORY OF PRESENT ILLNESS
[3] : the patient reports pain that is 3/10 in severity [Clean/Dry/Intact] : clean, dry and intact [Neuro Intact] : an unremarkable neurological exam [Vascular Intact] : ~T peripheral vascular exam normal [Xray (Date:___)] : [unfilled] Xray -  [Doing Well] : is doing well [No Sign of Infection] : is showing no signs of infection [___ Weeks Post Op] : [unfilled] weeks post op [Chills] : no chills [Fever] : no fever [Nausea] : no nausea [Vomiting] : no vomiting [de-identified] : spo R knee ACL reconstruction with quad graft and medial meniscal repair. DOS 11/04/2022 [de-identified] : QUYNH DEMPSEY is a 37 year female being seen for first post op R knee ACL reconstruction with quad graft and medial meniscal repair. QUYNH presents today wearing karine locked in extension and ambulating with crutches.  She denies fever chills.  She presents for wound check.  She states she has had some redness and bumps noted around me incisions that started a few days ago.  She had 1 area of clear drainage yesterday.  She is attending PT at [facility]. [de-identified] : Right Knee demonstrates well-healed incisions with granulation tissue.  Around the distal 2 incisions there were small raised areas of urticaria.  There are no signs of infection or erythema.  There is no active drainage noted.  There is a small area around the central incision that shows what appears to be a slightly macerated area of blistering.  There is no pus or drainage from the incisions.  The patient has range of motion from 0 to 85 degrees comfortably.  She can perform a straight leg raise with mild weakness.  The Lachman demonstrates a solid and intact ACL graft.  Her calf and thigh are soft and nontender.  She is grossly neurovascular intact distally. [de-identified] : QUYNH DEMPSEY is a 37 year y/o female who presents today for spo R knee ACL reconstruction with quad graft and medial meniscal repair. DOS 11/04/2022 . I advised the patient to apply a little bit of hydrocortisone cream around the areas of reaction.  She will avoid placing these over the incisions.  She can apply scar cream and vitamin E lotion as well.  Next week she can begin weightbearing with the brace and discontinue her crutches.  She can also progress past 90 degrees of flexion.  She will follow the rehab protocol.  She will monitor for any signs of infection.  She will follow-up in about 6 weeks or sooner if there are any wound questions.  All questions were answered.

## 2022-12-08 NOTE — ADDENDUM
[FreeTextEntry1] : Documented by Sparkle Cage acting as a scribe for Dr. Mcfarland and Kulwant Long PA-C on 12/08/2022.   All medical record entries made by the Scribe were at my, Dr. Mcfarland, and Kulwant Long's, direction and personally dictated by me on 12/08/2022. I have reviewed the chart and agree that the record accurately reflects my personal performance of the history, physical exam, procedure and imaging.

## 2022-12-20 ENCOUNTER — APPOINTMENT (OUTPATIENT)
Dept: ORTHOPEDIC SURGERY | Facility: CLINIC | Age: 37
End: 2022-12-20

## 2023-01-31 ENCOUNTER — APPOINTMENT (OUTPATIENT)
Dept: ORTHOPEDIC SURGERY | Facility: CLINIC | Age: 38
End: 2023-01-31
Payer: COMMERCIAL

## 2023-01-31 PROCEDURE — 99024 POSTOP FOLLOW-UP VISIT: CPT

## 2023-01-31 NOTE — HISTORY OF PRESENT ILLNESS
[___ Weeks Post Op] : [unfilled] weeks post op [1] : the patient reports pain that is 1/10 in severity [Clean/Dry/Intact] : clean, dry and intact [Neuro Intact] : an unremarkable neurological exam [Vascular Intact] : ~T peripheral vascular exam normal [Doing Well] : is doing well [No Sign of Infection] : is showing no signs of infection [Chills] : no chills [Fever] : no fever [Nausea] : no nausea [Vomiting] : no vomiting [de-identified] : spo R knee ACL reconstruction with quad graft and medial meniscal repair. DOS 11/04/2022 [de-identified] : QUYNH DEMPSEY is a 37 year female being seen for first post op R knee ACL reconstruction with quad graft and medial meniscal repair, 12 weeks ago.  She states she felt a pop in her knee a couple weeks ago but has not had any instability or giving way.  She continues to work with PT. [de-identified] : Right Knee demonstrates well-healed incisions with no signs of infection.  She has mild quad atrophy.  Knee range of motion is 0 to 125 degrees.  She is stable to Lachman testing.  She has good strength to straight leg raising.  No valgus or varus instability.  Calf and thigh are soft and nontender.  She is grossly neurovascular intact distally. [de-identified] :  No new imaging performed today.  [de-identified] : QUYNH DEMPSEY is a 37 year y/o female who presents today for spo R knee ACL reconstruction with quad graft and medial meniscal repair. DOS 11/04/2022 .  At this point the patient can continue with physical therapy.  We discussed progressing too quickly.  I cautioned her against any high impact activities.  She should follow the rehab protocol.  She will follow-up in 2 to 3 months for repeat evaluation.  If there are any problems she will call us.  All questions were answered.

## 2023-03-21 ENCOUNTER — APPOINTMENT (OUTPATIENT)
Dept: ORTHOPEDIC SURGERY | Facility: CLINIC | Age: 38
End: 2023-03-21
Payer: COMMERCIAL

## 2023-03-21 VITALS
WEIGHT: 110 LBS | SYSTOLIC BLOOD PRESSURE: 117 MMHG | DIASTOLIC BLOOD PRESSURE: 73 MMHG | HEIGHT: 60 IN | BODY MASS INDEX: 21.6 KG/M2 | HEART RATE: 72 BPM

## 2023-03-21 PROCEDURE — 99214 OFFICE O/P EST MOD 30 MIN: CPT

## 2023-03-21 NOTE — REASON FOR VISIT
[Follow-Up Visit] : a follow-up visit for [FreeTextEntry2] : spo R knee ACL reconstruction with quad graft and medial meniscal repair. DOS 11/04/2022.

## 2023-03-21 NOTE — HISTORY OF PRESENT ILLNESS
[de-identified] : QUYNH is a 38 year female here today for spo R knee ACL reconstruction with quad graft and medial meniscal repair. DOS 11/04/2022. She has been doing PT at [facility] with Sa[facility] with improvements. She started going back to work. She has been doing light work on the treadmill. She notes some clicking/popping. She notes a pulling feeling occasionally in the back of the knee.

## 2023-03-21 NOTE — DISCUSSION/SUMMARY
[de-identified] : We had a thorough discussion regarding the nature of her pain, the pathophysiology, as well as all treatment options. Patient is doing well. A note for work stating that she was here today was given at today's visit. She should continue with physical therapy. Patient was given prescription of formal physical therapy that she will perform 2x/wk for 6-8 wks. Patient will follow up in 12 wks for repeat clinical assessment. All questions were answered and the patient verbalized understanding. The patient is in agreement with this treatment plan.

## 2023-03-21 NOTE — ADDENDUM
[FreeTextEntry1] : Documented by Sparkle Cage acting as a scribe for Dr. Mcfarland and Kulwant Long PA-C on 03/21/2023.   All medical record entries made by the Scribe were at my, Dr. Mcfarland, and Kulwant Long's, direction and personally dictated by me on 03/21/2023. I have reviewed the chart and agree that the record accurately reflects my personal performance of the history, physical exam, procedure and imaging.

## 2023-03-21 NOTE — PHYSICAL EXAM
[de-identified] : Physical Exam: \par General: Well appearing, no acute distress \par Neurologic: A&Ox3, No focal deficits \par Head: NCAT without abrasions, lacerations, or ecchymosis to head, face, or scalp \par Eyes: No scleral icterus, no gross abnormalities \par Respiratory: Equal chest wall expansion bilaterally, no accessory muscle use \par Lymphatic: No lymphadenopathy palpated \par Skin: Warm and dry \par Psychiatric: Normal mood and affect \par \par Right Knee demonstrates well-healed incisions with no signs of infection. She has mild quad atrophy. Knee range of motion is 0 to 125 degrees. She is stable to Lachman testing. She has good strength to straight leg raising. No valgus or varus instability. Calf and thigh are soft and nontender. She is grossly neurovascular intact distally. The surgical incision site(s) was clean, dry and intact. Additional findings included an unremarkable neurological exam and peripheral vascular exam normal.

## 2023-03-21 NOTE — REVIEW OF SYSTEMS
[Negative] : Heme/Lymph [FreeTextEntry9] : spo R knee ACL reconstruction with quad graft and medial meniscal repair. DOS 11/04/2022.

## 2023-03-22 ENCOUNTER — APPOINTMENT (OUTPATIENT)
Dept: ORTHOPEDIC SURGERY | Facility: CLINIC | Age: 38
End: 2023-03-22

## 2023-04-12 ENCOUNTER — APPOINTMENT (OUTPATIENT)
Dept: ORTHOPEDIC SURGERY | Facility: CLINIC | Age: 38
End: 2023-04-12

## 2023-06-19 NOTE — ED PROVIDER NOTE - PRINCIPAL DIAGNOSIS
Detail Level: Detailed Detail Level: Zone Detail Level: Generalized Prescription Strength Graduated Compression Stockings Recommendations: The patient was counseled that prescription strength graduated compression stockings should be worn for all waking hours. They will follow up with a venous specialist to monitor graduated compression stocking usage and their symptoms. Dysuria Acute cystitis with hematuria

## 2023-07-06 ENCOUNTER — APPOINTMENT (OUTPATIENT)
Dept: ORTHOPEDIC SURGERY | Facility: CLINIC | Age: 38
End: 2023-07-06
Payer: COMMERCIAL

## 2023-07-06 PROCEDURE — 99213 OFFICE O/P EST LOW 20 MIN: CPT

## 2023-07-08 NOTE — HISTORY OF PRESENT ILLNESS
[de-identified] : QUYNH is a 38 year female here today for spo R knee ACL reconstruction with quad graft and medial meniscal repair. DOS 11/04/2022. She has been doing well. C/o some minor clicking. Has resumed activities such as dancing.

## 2023-07-08 NOTE — END OF VISIT
[FreeTextEntry3] : Documented by Vicky Odonnell  acting as a scribe for Dr. Mcfarland on 07/06/2023.\par \par All medical record entries made by the Scribe were at my, Dr. Mcfarland, direction and personally dictated by me on 07/06/2023. I have reviewed the chart and agree that the record accurately reflects my personal performance of the history, physical exam, procedure and imaging.\par

## 2023-07-08 NOTE — DISCUSSION/SUMMARY
[de-identified] : We had a thorough discussion regarding the nature of her pain, the pathophysiology, as well as all treatment options. She should continue with physical therapy and transition to the gym. Patient was given prescription of formal physical therapy. Patient will follow up in 12 wks for repeat clinical assessment. All questions were answered and the patient verbalized understanding. The patient is in agreement with this treatment plan.

## 2023-07-08 NOTE — PHYSICAL EXAM
[de-identified] : Physical Exam: \par General: Well appearing, no acute distress \par Neurologic: A&Ox3, No focal deficits \par Head: NCAT without abrasions, lacerations, or ecchymosis to head, face, or scalp \par Eyes: No scleral icterus, no gross abnormalities \par Respiratory: Equal chest wall expansion bilaterally, no accessory muscle use \par Lymphatic: No lymphadenopathy palpated \par Skin: Warm and dry \par Psychiatric: Normal mood and affect \par \par Right Knee demonstrates well-healed incisions with no signs of infection. Knee range of motion: full extension, full flexion. She is stable to Lachman testing. She has good strength to straight leg raising. No valgus or varus instability. Calf and thigh are soft and nontender. She is grossly neurovascular intact distally. The surgical incision site(s) was clean, dry and intact. Additional findings included an unremarkable neurological exam and peripheral vascular exam normal.

## 2023-11-14 ENCOUNTER — APPOINTMENT (OUTPATIENT)
Dept: ORTHOPEDIC SURGERY | Facility: CLINIC | Age: 38
End: 2023-11-14
Payer: COMMERCIAL

## 2023-11-14 DIAGNOSIS — S83.411A SPRAIN OF MEDIAL COLLATERAL LIGAMENT OF RIGHT KNEE, INITIAL ENCOUNTER: ICD-10-CM

## 2023-11-14 DIAGNOSIS — S83.511A SPRAIN OF ANTERIOR CRUCIATE LIGAMENT OF RIGHT KNEE, INITIAL ENCOUNTER: ICD-10-CM

## 2023-11-14 PROCEDURE — 99213 OFFICE O/P EST LOW 20 MIN: CPT

## 2023-11-22 NOTE — ED ADULT NURSE NOTE - MODE OF DISCHARGE
Child Life Progress Note    Name: Steven Sosa  : 2011   Sex: male    Consult Method: Child life assessment    Intro Statement: This Certified Child Life Specialist (CCLS) introduced self and services to Steven, a 12 y.o. male and family.    Settings: Inpatient Peds Acute    Baseline Temperament: Moderate adaptability; adaptability may vary in specific situations    Normalization Provided: No; FOC stated that patient really only engages in the phone and has not been wanting to play on it.    Procedure: N/A; help support adjustment to the hospital environment.    Caregiver(s) Present: Father    Caregiver(s) Involvement: Present, Engaged, and Supportive    Outcome:   This Certified Child Life Specialist met with patient and patient's Father to introduce self and services. Upon assessment, patient was not able to verbalize in a developmentally appropriate manner why the patient is in the hospital due to a developmental delay. Patient has a history of Autism and is nonverbal at baseline. CCLS offered normalization items to help foster positive coping throughout admission. No further needs were assessed at this time. Patient has demonstrated developmentally appropriate reactions/responses to hospitalization. However, patient would benefit from psychological preparation and support for future healthcare encounters.    Patient and family appreciative of child life services and denied any further child life needs at this time.    Please call child life as needs or concerns arise.      Time spent with the Patient: 15 minutes          With knee mobilizer/Wheelchair

## 2023-11-27 ENCOUNTER — NON-APPOINTMENT (OUTPATIENT)
Age: 38
End: 2023-11-27